# Patient Record
Sex: MALE | Race: OTHER | Employment: FULL TIME | URBAN - METROPOLITAN AREA
[De-identification: names, ages, dates, MRNs, and addresses within clinical notes are randomized per-mention and may not be internally consistent; named-entity substitution may affect disease eponyms.]

---

## 2021-09-02 ENCOUNTER — HOSPITAL ENCOUNTER (EMERGENCY)
Age: 46
Discharge: HOME | End: 2021-09-02

## 2021-09-02 VITALS
HEIGHT: 63 IN | BODY MASS INDEX: 31.89 KG/M2 | WEIGHT: 180 LBS | OXYGEN SATURATION: 94 % | RESPIRATION RATE: 16 BRPM | DIASTOLIC BLOOD PRESSURE: 82 MMHG | SYSTOLIC BLOOD PRESSURE: 127 MMHG | TEMPERATURE: 100.6 F | HEART RATE: 89 BPM

## 2021-09-02 DIAGNOSIS — U07.1 COVID-19: Primary | ICD-10-CM

## 2021-09-02 LAB — SARS-COV-2, NAAT: DETECTED

## 2021-09-02 PROCEDURE — 6370000000 HC RX 637 (ALT 250 FOR IP): Performed by: PHYSICIAN ASSISTANT

## 2021-09-02 PROCEDURE — 99283 EMERGENCY DEPT VISIT LOW MDM: CPT

## 2021-09-02 PROCEDURE — 87635 SARS-COV-2 COVID-19 AMP PRB: CPT

## 2021-09-02 RX ORDER — ACETAMINOPHEN 500 MG
1000 TABLET ORAL ONCE
Status: COMPLETED | OUTPATIENT
Start: 2021-09-02 | End: 2021-09-02

## 2021-09-02 RX ADMIN — ACETAMINOPHEN 1000 MG: 500 TABLET ORAL at 14:59

## 2021-09-02 ASSESSMENT — PAIN SCALES - GENERAL
PAINLEVEL_OUTOF10: 8
PAINLEVEL_OUTOF10: 8

## 2021-09-02 ASSESSMENT — PAIN DESCRIPTION - PAIN TYPE: TYPE: ACUTE PAIN

## 2021-09-02 ASSESSMENT — PAIN DESCRIPTION - LOCATION: LOCATION: GENERALIZED

## 2021-09-02 ASSESSMENT — PAIN DESCRIPTION - DESCRIPTORS: DESCRIPTORS: ACHING

## 2021-09-02 NOTE — ED NOTES
Patient's O2 SAT is 93% at rest on room air in [address] [place] [address] chair with respirations of 18. Ambulated approximately 100 feet with SAT maintaining at 93% on room air the entire time with increased respirations to 24.           Maikol Vigil LPN  09/08/55 7684

## 2021-09-02 NOTE — ED PROVIDER NOTES
[facility]  Department of Emergency Medicine   ED  Encounter Note  Admit Date/RoomTime: 2021  2:43 PM  ED Room: [place] [address]    NAME: Christo Wang  : 1975  MRN: [id number]     Chief Complaint:  Generalized Body Aches (states everything hurts, his bones, his brain. )    HISTORY OF PRESENT ILLNESS        Christo Wang is a 51 y.o. male who presents to the ED by private vehicle for generalized body aches beginning 5 days ago.  use due to patient being Polish-speaking. Patient denies cough, shortness of breath, fever, nausea, or diarrhea. Patient states that his \"brain hurts. \"The complaint has been persistent and are moderate in severity. Patient states that he has radiation of pain everywhere such as body aches. Patient states that he has been drinking lemon tea and completed some home remedies with minimal relief. Patient denies any factors that make his pain worse. ROS   Pertinent positives and negatives are stated within HPI, all other systems reviewed and are negative. Past Medical History:  has no past medical history on file. Surgical History:  has no past surgical history on file. Social History:  reports that he has never smoked. He has never used smokeless tobacco. He reports that he does not drink alcohol and does not use drugs. Family History: family history is not on file. Allergies: Patient has no known allergies. PHYSICAL EXAM   Oxygen Saturation Interpretation: Normal on room air analysis. ED Triage Vitals   BP Temp Temp src Pulse Resp SpO2 Height Weight   21 1434 21 1429 -- 21 1429 21 1434 21 1429 21 1434 21 1434   127/82 99.1 °F (37.3 °C)  94 16 91 % 5' 2.99\" (1.6 m) 180 lb (81.6 kg)         Physical Exam  Constitutional/General: Alert and oriented x3, well appearing, non toxic. HEENT:  NC/NT. PERRLA,  Airway patent.  No evidence of airway compromise. Neck: Supple, full ROM, non tender to palpation in the midline, no stridor, no crepitus, no meningeal signs  Respiratory: Lungs clear to auscultation bilaterally, no wheezes, rales, or rhonchi. Not in respiratory distress  CV:  Regular rate. Regular rhythm. No murmurs, gallops, or rubs. 2+ distal pulses  Chest: No chest wall tenderness  GI:  Abdomen Soft, Non tender, Non distended. +BS. No rebound, guarding, or rigidity. No pulsatile masses. Musculoskeletal: Moves all extremities x 4. Warm and well perfused, no clubbing, cyanosis, or edema. Capillary refill <3 seconds  Integument: skin warm and dry. No rashes. Lymphatic: no lymphadenopathy noted  Neurologic: GCS 15, no focal deficits, symmetric strength 5/5 in the upper and lower extremities bilaterally  Psychiatric: Normal Affect      Lab / Imaging Results   (All laboratory and radiology results have been personally reviewed by myself)  Labs:  Results for orders placed or performed during the hospital encounter of 09/02/21   COVID-19, Rapid    Specimen: Nasopharyngeal Swab   Result Value Ref Range    SARS-CoV-2, NAAT DETECTED (A) Not Detected     Imaging: All Radiology results interpreted by Radiologist unless otherwise noted. No orders to display       ED Course / Medical Decision Making     Medications   acetaminophen (TYLENOL) tablet 1,000 mg (1,000 mg Oral Given 9/2/21 1459)        Re-Evaluations:  9/2/21      Time: 3:50  Patients condition remains stable. Consultations:             None    Procedures:   none    MDM:  Patient is a 51year old male presenting today to the ED with generalized body aches. Patient denies any additional symptoms such as fever, chills, nausea, vomiting, or diarrhea. Patient denies any recent travel or contact with anyone who is covid positive. Patient was evaluated today and is in no acute distress. Patient's is able to ambulate. Patient remained 95% on room air during ambulatory pulse oxygen monitoring. Patient received a rapid covid test today, which revealed a positive result. Patient was advised to quarantine for 10-14 days and was provided a work note. Patient was educated on the importance of quarantining, fluids, and rest. Patient is to alternate tylenol and ibuprofen every 6 to 8 hours with food for his pain. Patient is to return to the ED if he experiences any worsening symptoms such as shortness of breath or chest pain. Patient agrees to the plan of care, understand all information provided, is stable, and ready for discharge. Patient is to follow up with his PCP. Patient was explicitly instructed on specific signs and symptoms on which to return to the emergency room for. Patient was instructed to return to the ER for any new or worsening symptoms. Additional discharge instructions were given verbally. All questions were answered. Patient is comfortable and agreeable with discharge plan. Patient in no acute distress and non-toxic in appearance. Plan of Care/Counseling:  Sandra Decker PA-C reviewed today's visit with the patient in addition to providing specific details for the plan of care and counseling regarding the diagnosis and prognosis. Questions are answered at this time and are agreeable with the plan. ASSESSMENT     1. COVID-19      This patient's ED course included: a personal history and physicial examination, re-evaluation prior to disposition and IV medications. Ambulatory pulse ox completed and patient remained at 95%. This patient has remained hemodynamically stable during their ED course. PLAN   Discharged home. Patient condition is stable. New Medications     New Prescriptions    No medications on file     Electronically signed by Sandra Decker PA-C   DD: 9/2/21  **This report was transcribed using voice recognition software. Every effort was made to ensure accuracy; however, inadvertent computerized transcription errors may be present.   END OF PROVIDER NOTE     Nicole Veliz PA-C  09/02/21 0743

## 2021-09-02 NOTE — ED NOTES
Patient given discharge instructions by provider via [employer] . Discharge paperwork has been printed in [employer] as well for his convenience.      [employer], LPN  [id number]/88/77 [phone]

## 2021-09-03 ENCOUNTER — CARE COORDINATION (OUTPATIENT)
Dept: CARE COORDINATION | Age: 46
End: 2021-09-03

## 2021-09-03 NOTE — CARE COORDINATION
Patient contacted regarding COVID-19 risk, exposure, diagnosis, pulse oximeter ordered at discharge and monoclonal antibody infusion follow up. Discussed COVID-19 related testing which was available at this time. Test results were positive. Patient informed of results, if available? Yes. LPN Care Coordinator contacted the patient by telephone to perform post discharge assessment. Call within 2 business days of discharge: Yes. Verified name and  with patient as identifiers. Provided introduction to self, and explanation of the CTN/ACM role, and reason for call due to risk factors for infection and/or exposure to COVID-19. Symptoms reviewed with patient who verbalized the following symptoms: fatigue, pain or aching joints and cough. Due to no new or worsening symptoms encounter was not routed to provider for escalation. Discussed follow-up appointments. If no appointment was previously scheduled, appointment scheduling offered: No.  [facility] follow up appointment(s): No future appointments. Non-[facility] follow up appointment(s): called pt via  #[phone] states he is feeling the same. Reviewed cdc guidelines and encouraged to increase fluids and to follow up with pcp find a physician info given will follow up with pt in one week      Non-face-to-face services provided:  Obtained and reviewed discharge summary and/or continuity of care documents     Advance Care Planning:   Does patient have an Advance Directive:  not on file. Educated patient about risk for severe COVID-19 due to risk factors according to CDC guidelines. LPN CC reviewed discharge instructions, medical action plan and red flag symptoms with the patient who verbalized understanding. Discussed COVID vaccination status: Yes. Education provided on COVID-19 vaccination as appropriate. Discussed exposure protocols and quarantine with CDC Guidelines.  Patient was given an opportunity to verbalize any questions and concerns and agrees to contact LPN CC or health care provider for questions related to their healthcare. Reviewed and educated patient on any new and changed medications related to discharge diagnosis     Was patient discharged with a pulse oximeter? No Discussed and confirmed pulse oximeter discharge instructions and when to notify provider or seek emergency care. LPN CC provided contact information. Plan for follow-up call in 5-7 days based on severity of symptoms and risk factors.

## 2021-09-10 ENCOUNTER — CARE COORDINATION (OUTPATIENT)
Dept: CARE COORDINATION | Age: 46
End: 2021-09-10